# Patient Record
Sex: FEMALE | Race: BLACK OR AFRICAN AMERICAN | NOT HISPANIC OR LATINO | Employment: FULL TIME | URBAN - METROPOLITAN AREA
[De-identification: names, ages, dates, MRNs, and addresses within clinical notes are randomized per-mention and may not be internally consistent; named-entity substitution may affect disease eponyms.]

---

## 2023-03-17 ENCOUNTER — SOCIAL WORK (OUTPATIENT)
Dept: ADMINISTRATIVE | Facility: OTHER | Age: 28
End: 2023-03-17

## 2023-03-17 NOTE — PROGRESS NOTES
SW completed pt's notification of pregnancy and faxed/scanned into epic. No other needs identified at this time.     Vesta Becerra,MSW  Pager#[phone]

## 2023-04-26 PROBLEM — Z34.92 SUPERVISION OF NORMAL PREGNANCY IN SECOND TRIMESTER: Status: ACTIVE | Noted: 2023-04-26

## 2023-06-13 ENCOUNTER — PATIENT MESSAGE (OUTPATIENT)
Dept: ADMINISTRATIVE | Facility: OTHER | Age: 28
End: 2023-06-13

## 2023-06-29 PROBLEM — O36.5990 FETAL GROWTH RESTRICTION ANTEPARTUM: Status: ACTIVE | Noted: 2023-06-29

## 2023-06-29 PROBLEM — R10.9 ABDOMINAL PAIN AFFECTING PREGNANCY: Status: ACTIVE | Noted: 2023-06-29

## 2023-06-29 PROBLEM — O23.592 TRICHOMONAL VAGINITIS DURING PREGNANCY IN SECOND TRIMESTER: Status: ACTIVE | Noted: 2023-06-29

## 2023-06-29 PROBLEM — O09.92 SUPERVISION OF HIGH RISK PREGNANCY IN SECOND TRIMESTER: Status: ACTIVE | Noted: 2023-04-26

## 2023-06-29 PROBLEM — R51.9 PREGNANCY HEADACHE IN SECOND TRIMESTER: Status: ACTIVE | Noted: 2023-06-29

## 2023-06-29 PROBLEM — O26.892 PREGNANCY HEADACHE IN SECOND TRIMESTER: Status: ACTIVE | Noted: 2023-06-29

## 2023-06-29 PROBLEM — O26.899 ABDOMINAL PAIN AFFECTING PREGNANCY: Status: ACTIVE | Noted: 2023-06-29

## 2023-06-29 PROBLEM — A59.01 TRICHOMONAL VAGINITIS DURING PREGNANCY IN SECOND TRIMESTER: Status: ACTIVE | Noted: 2023-06-29

## 2023-07-10 PROBLEM — R10.9 ABDOMINAL PAIN AFFECTING PREGNANCY: Status: RESOLVED | Noted: 2023-06-29 | Resolved: 2023-07-10

## 2023-07-10 PROBLEM — O26.899 ABDOMINAL PAIN AFFECTING PREGNANCY: Status: RESOLVED | Noted: 2023-06-29 | Resolved: 2023-07-10

## 2023-07-10 PROBLEM — O09.93 SUPERVISION OF HIGH RISK PREGNANCY IN THIRD TRIMESTER: Status: ACTIVE | Noted: 2023-04-26

## 2023-07-11 ENCOUNTER — PATIENT MESSAGE (OUTPATIENT)
Dept: RESEARCH | Facility: HOSPITAL | Age: 28
End: 2023-07-11

## 2023-07-19 ENCOUNTER — PATIENT MESSAGE (OUTPATIENT)
Dept: RESEARCH | Facility: HOSPITAL | Age: 28
End: 2023-07-19

## 2023-07-24 ENCOUNTER — PATIENT MESSAGE (OUTPATIENT)
Dept: RESEARCH | Facility: HOSPITAL | Age: 28
End: 2023-07-24

## 2023-08-08 PROBLEM — O16.3 ELEVATED BLOOD PRESSURE AFFECTING PREGNANCY IN THIRD TRIMESTER, ANTEPARTUM: Status: ACTIVE | Noted: 2023-08-08

## 2024-11-10 ENCOUNTER — ON-DEMAND VIRTUAL (OUTPATIENT)
Dept: URGENT CARE | Facility: CLINIC | Age: 29
End: 2024-11-10
Payer: MEDICAID

## 2024-11-10 DIAGNOSIS — T14.8XXA WOUND INFECTION: Primary | ICD-10-CM

## 2024-11-10 DIAGNOSIS — L08.9 WOUND INFECTION: Primary | ICD-10-CM

## 2024-11-10 PROCEDURE — 99213 OFFICE O/P EST LOW 20 MIN: CPT | Mod: 95,,, | Performed by: NURSE PRACTITIONER

## 2024-11-10 RX ORDER — SULFAMETHOXAZOLE AND TRIMETHOPRIM 800; 160 MG/1; MG/1
1 TABLET ORAL 2 TIMES DAILY
Qty: 14 TABLET | Refills: 0 | Status: SHIPPED | OUTPATIENT
Start: 2024-11-10 | End: 2024-11-17

## 2024-11-10 NOTE — PROGRESS NOTES
Subjective:      Patient ID: Salvador Christian is a 29 y.o. female.    Vitals:  vitals were not taken for this visit.     Chief Complaint: Wound Infection      Visit Type: TELE AUDIOVISUAL - This visit was conducted virtually based on  subjective information and limited objective exam    Present with the patient at the time of consultation: TELEMED PRESENT WITH PATIENT: None  Two patient identifiers used to verify patient- saying out date of birth and full name.       Past Medical History:   Diagnosis Date    Headaches, cluster      History reviewed. No pertinent surgical history.  Review of patient's allergies indicates:   Allergen Reactions    Shrimp Anxiety, Hives, Itching and Rash     Current Outpatient Medications on File Prior to Visit   Medication Sig Dispense Refill    acetaminophen (TYLENOL) 325 MG tablet Take 325 mg by mouth every 6 (six) hours as needed for Pain.      ibuprofen (ADVIL,MOTRIN) 400 MG tablet Take 1.5 tablets (600 mg total) by mouth every 6 (six) hours as needed (Pain). 20 tablet 0    prenatal 25/iron fum/folic/dha (PRENATAL-1 ORAL) Take by mouth.      prenatal vit no.130-iron-folic (PRENATAL VITAMIN) 27 mg iron- 800 mcg Tab Take 1 tablet by mouth once daily. 30 tablet 24    prochlorperazine (COMPAZINE) 10 MG tablet Take 1 tablet (10 mg total) by mouth every 6 (six) hours as needed (As needed for headache). 30 tablet 1     No current facility-administered medications on file prior to visit.     Family History   Problem Relation Name Age of Onset    No Known Problems Father      No Known Problems Mother         Medications Ordered                [id number] [place] - [address] AT [facility]   [address], [place] [address]    Telephone: [phone]   Fax: [phone]   Hours: Not open 24 hours                         E-Prescribed (1 of 1)              sulfamethoxazole-trimethoprim 800-160mg (BACTRIM DS) 800-160 mg Tab    Sig: Take 1 tablet by  mouth 2 (two) times daily. for 7 days       Start: 11/10/24     Quantity: 14 tablet Refills: 0                           Ohs Peq Odvv Intake    11/10/2024 12:23 PM CST - Filed by Patient   What is your current physical address in the event of a medical emergency? [address] [place] [address]   Are you able to take your vital signs? No   Please attach any relevant images or files    Is your employer contracted with [facility]? No         30 yo female with c/o cut to right ring finger now with swelling and redness and tenderness. She denies drainage. Positive swelling.         Constitution: Negative.   HENT: Negative.     Cardiovascular: Negative.    Respiratory: Negative.     Gastrointestinal: Negative.    Endocrine: negative.   Genitourinary: Negative.  Negative for frequency and urgency.   Musculoskeletal: Negative.    Skin:  Positive for wound and erythema.   Allergic/Immunologic: Negative.    Neurological: Negative.    Hematologic/Lymphatic: Negative.    Psychiatric/Behavioral: Negative.          Objective:   The physical exam was conducted virtually.  LOCATION OF PATIENT [facility] x 3 ; no acute distress noted; appearance normal; mood and behavior normal; thought process normal  Head- normocephalic  Nose- appears normal, no discharge or erythema  Eyes- pupils appear normal in size, no drainage, no erythema  Ears- normal appearing; no discharge, no erythema  Mouth- appears normal  Oropharynx- no erythema, lesions  Lungs- breathing at a normal rate, no acute distress noted  Heart- no reports of tachycardia, palpitations, chest pain  Abdomen- non distended, non tender reported by patient  Skin- warm and dry, no erythema or edema noted by patient or visualized  Psych- as above; no si/hi      Assessment:     1. Wound infection        Plan:         Thank you for choosing [facility]!    Our goal in the [facility] is to always provide outstanding medical care. You may  receive a survey by mail or e-mail in the next week regarding your experience today. We would greatly appreciate you completing and returning the survey. Your feedback provides us with a way to recognize our staff who provide very good care, and it helps us learn how to improve when your experience was below our aspiration of excellence.         We appreciate you trusting us with your medical care. We hope you feel better soon. We will be happy to take care of you for all of your future medical needs.    You must understand that you've received an Urgent Care treatment only and that you may be released before all your medical problems are known or treated. You, the patient, will arrange for follow up care as instructed.    Follow up with your PCP or specialty clinic as directed in the next 1-2 weeks if not improved or as needed.  You can call [phone] to schedule an appointment with the appropriate provider.    If your condition worsens we recommend that you receive another evaluation in person, with your primary care provider, urgent care or at the emergency room immediately or contact your primary medical clinics after hours call service to discuss your concerns.         Wound infection  -     sulfamethoxazole-trimethoprim 800-160mg (BACTRIM DS) 800-160 mg Tab; Take 1 tablet by mouth 2 (two) times daily. for 7 days  Dispense: 14 tablet; Refill: 0

## 2024-12-19 ENCOUNTER — ON-DEMAND VIRTUAL (OUTPATIENT)
Dept: URGENT CARE | Facility: CLINIC | Age: 29
End: 2024-12-19
Payer: MEDICAID

## 2024-12-19 DIAGNOSIS — J02.9 SORE THROAT: ICD-10-CM

## 2024-12-19 DIAGNOSIS — R05.9 COUGH, UNSPECIFIED TYPE: Primary | ICD-10-CM

## 2024-12-19 RX ORDER — PROMETHAZINE HYDROCHLORIDE AND DEXTROMETHORPHAN HYDROBROMIDE 6.25; 15 MG/5ML; MG/5ML
5 SYRUP ORAL 3 TIMES DAILY PRN
Qty: 118 ML | Refills: 0 | Status: SHIPPED | OUTPATIENT
Start: 2024-12-19 | End: 2024-12-28

## 2024-12-19 RX ORDER — LORATADINE 10 MG/1
10 TABLET ORAL DAILY
Qty: 30 TABLET | Refills: 0 | Status: SHIPPED | OUTPATIENT
Start: 2024-12-19 | End: 2025-01-18

## 2024-12-19 NOTE — PATIENT INSTRUCTIONS
Warm salt water gargles, tea with honey, chloraseptic spray  Increase fluids and rest  Take medications as prescribed  Continue antihistamine (zyrtec or Claritin), Flonase and saline nasal spray  If symptoms worsening or not improved f/u in Urgent Care or with PCP

## 2024-12-19 NOTE — PROGRESS NOTES
Subjective:      Patient ID: Salvador Christian is a 29 y.o. female.    Vitals:  vitals were not taken for this visit.     Chief Complaint: No chief complaint on file.      Visit Type: TELE AUDIOVISUAL    Present with the patient at the time of consultation: TELEMED PRESENT WITH PATIENT: None [place], [place].     Past Medical History:   Diagnosis Date    Headaches, cluster      History reviewed. No pertinent surgical history.  Review of patient's allergies indicates:   Allergen Reactions    Shrimp Anxiety, Hives, Itching and Rash     Current Outpatient Medications on File Prior to Visit   Medication Sig Dispense Refill    acetaminophen (TYLENOL) 325 MG tablet Take 325 mg by mouth every 6 (six) hours as needed for Pain.      ibuprofen (ADVIL,MOTRIN) 400 MG tablet Take 1.5 tablets (600 mg total) by mouth every 6 (six) hours as needed (Pain). 20 tablet 0    prenatal 25/iron fum/folic/dha (PRENATAL-1 ORAL) Take by mouth.      prenatal vit no.130-iron-folic (PRENATAL VITAMIN) 27 mg iron- 800 mcg Tab Take 1 tablet by mouth once daily. 30 tablet 24    prochlorperazine (COMPAZINE) 10 MG tablet Take 1 tablet (10 mg total) by mouth every 6 (six) hours as needed (As needed for headache). 30 tablet 1     No current facility-administered medications on file prior to visit.     Family History   Problem Relation Name Age of Onset    No Known Problems Father      No Known Problems Mother         Medications Ordered                [id number] - [place], [place] - [address] [place] [address]    Telephone: [phone]   Fax: [phone]   Hours: Not open 24 hours                         E-Prescribed (2 of 2)              loratadine (CLARITIN) 10 mg tablet    Sig: Take 1 tablet (10 mg total) by mouth once daily.       Start: 12/19/24     Quantity: 30 tablet Refills: 0                         promethazine-dextromethorphan (PROMETHAZINE-DM) 6.25-15 mg/5 mL Syrp    Sig: Take  5 mLs by mouth 3 (three) times daily as needed (cough).       Start: 12/19/24     Quantity: 118 mL Refills: 0                           Ohs Peq Odvv Intake    12/19/2024 12:44 PM CST - Filed by Patient   What is your current physical address in the event of a medical emergency? [address] [place] [id number]   Are you able to take your vital signs? No   Please attach any relevant images or files    Is your employer contracted with [facility]? No         Pt presents with c/o cough, sore throat x 1 day with ha and sinus pressure, associated chills.Reports unable to check temperature. Denies any CP, SOB, abdominal pain.         Constitution: Positive for chills and fatigue.   HENT:  Positive for sinus pressure and sore throat. Negative for voice change.    Cardiovascular:  Negative for chest pain.   Respiratory:  Positive for cough. Negative for shortness of breath.    Gastrointestinal:  Negative for nausea and vomiting.   Neurological:  Positive for headaches. Negative for dizziness.        Objective:   The physical exam was conducted virtually.  Physical Exam   Constitutional: She is oriented to person, place, and time. No distress.   HENT:   Nose: Rhinorrhea and congestion present.   Eyes: Extraocular movement intact   Neck: Neck supple.   Pulmonary/Chest: Effort normal.   Neurological: She is alert and oriented to person, place, and time.   Skin: Skin is dry.   Psychiatric: Mood normal.       Assessment:     1. Cough, unspecified type    2. Sore throat        Plan:       Cough, unspecified type  -     loratadine (CLARITIN) 10 mg tablet; Take 1 tablet (10 mg total) by mouth once daily.  Dispense: 30 tablet; Refill: 0  -     promethazine-dextromethorphan (PROMETHAZINE-DM) 6.25-15 mg/5 mL Syrp; Take 5 mLs by mouth 3 (three) times daily as needed (cough).  Dispense: 118 mL; Refill: 0    Sore throat      We appreciate you trusting us with your medical care. We hope you feel better soon. We will be  happy to take care of you for all of your future medical needs.     You must understand that you've received Virtual treatment only and that you may be released before all your medical problems are known or treated. You, the patient, will arrange for follow up care as instructed.     Follow up with your PCP or specialty clinic as directed in the next 1 week if not improved or as needed. You can call [phone] to schedule an appointment with the appropriate provider.     If your condition worsens we recommend that you receive another evaluation in person, with your primary care provider, urgent care or at the emergency room immediately or contact your primary medical clinics after hours call service to discuss your concerns.